# Patient Record
Sex: FEMALE | Race: ASIAN | NOT HISPANIC OR LATINO | Employment: FULL TIME | URBAN - METROPOLITAN AREA
[De-identification: names, ages, dates, MRNs, and addresses within clinical notes are randomized per-mention and may not be internally consistent; named-entity substitution may affect disease eponyms.]

---

## 2020-10-01 ENCOUNTER — OFFICE VISIT (OUTPATIENT)
Dept: MEDICAL GROUP | Facility: PHYSICIAN GROUP | Age: 35
End: 2020-10-01
Payer: COMMERCIAL

## 2020-10-01 VITALS
WEIGHT: 128.75 LBS | HEIGHT: 64 IN | SYSTOLIC BLOOD PRESSURE: 100 MMHG | HEART RATE: 71 BPM | TEMPERATURE: 98.8 F | BODY MASS INDEX: 21.98 KG/M2 | DIASTOLIC BLOOD PRESSURE: 60 MMHG | OXYGEN SATURATION: 98 %

## 2020-10-01 DIAGNOSIS — R73.01 IMPAIRED FASTING BLOOD SUGAR: ICD-10-CM

## 2020-10-01 DIAGNOSIS — D50.0 ANEMIA DUE TO BLOOD LOSS, CHRONIC: ICD-10-CM

## 2020-10-01 DIAGNOSIS — Z00.00 ROUTINE PHYSICAL EXAMINATION: ICD-10-CM

## 2020-10-01 DIAGNOSIS — Z23 NEED FOR IMMUNIZATION AGAINST INFLUENZA: ICD-10-CM

## 2020-10-01 PROCEDURE — 90686 IIV4 VACC NO PRSV 0.5 ML IM: CPT | Performed by: FAMILY MEDICINE

## 2020-10-01 PROCEDURE — 99385 PREV VISIT NEW AGE 18-39: CPT | Mod: 25 | Performed by: FAMILY MEDICINE

## 2020-10-01 PROCEDURE — 90471 IMMUNIZATION ADMIN: CPT | Performed by: FAMILY MEDICINE

## 2020-10-01 SDOH — HEALTH STABILITY: MENTAL HEALTH: HOW OFTEN DO YOU HAVE A DRINK CONTAINING ALCOHOL?: NEVER

## 2020-10-01 ASSESSMENT — PATIENT HEALTH QUESTIONNAIRE - PHQ9: CLINICAL INTERPRETATION OF PHQ2 SCORE: 0

## 2020-10-01 NOTE — LETTER
[facility]  Mairya Hardy M.D.  [address]  [place] [address]  Fax: [phone]   Authorization for Release/Disclosure of   Protected Health Information   Name: RAYMUNDO CALLAHAN : 1985 SSN: [id number]   Address: [facility] [address]  [place] [address] Phone:    [phone] (home)    I authorize the entity listed below to release/disclose the PHI below to:   [facility]/Mariya Hardy M.D. and Mariya Hardy M.D.   Provider or Entity Name:    [facility]   Address   City, [facility], [facility]   Phone:      Fax:     Reason for request: continuity of care   Information to be released:    [  ] LAST COLONOSCOPY,  including any PATH REPORT and follow-up  [  ] LAST FIT/COLOGUARD RESULT [  ] LAST DEXA  [  ] LAST MAMMOGRAM  [  ] LAST PAP  [  ] LAST LABS [  ] RETINA EXAM REPORT  [  ] IMMUNIZATION RECORDS  [ x ] Release all info      [  ] Check here and initial the line next to each item to release ALL health information INCLUDING  _____ Care and treatment for drug and / or alcohol abuse  _____ HIV testing, infection status, or AIDS  _____ Genetic Testing    DATES OF SERVICE OR TIME PERIOD TO BE DISCLOSED: __2019 hospital records___________  I understand and acknowledge that:  * This Authorization may be revoked at any time by you in writing, except if your health information has already been used or disclosed.  * Your health information that will be used or disclosed as a result of you signing this authorization could be re-disclosed by the recipient. If this occurs, your re-disclosed health information may no longer be protected by State or Federal laws.  * You may refuse to sign this Authorization. Your refusal will not affect your ability to obtain treatment.  * This Authorization becomes effective upon signing and will  on (date) __________.      If no date is indicated, this Authorization will  one (1) year from the signature date.    Name: Raymundo Callahan    Signature:   Date:      10/1/2020       PLEASE FAX REQUESTED RECORDS BACK TO: [phone]

## 2020-10-01 NOTE — LETTER
[facility]  Mariya Hardy M.D.  [address]  [place] [address]  Fax: [phone]   Authorization for Release/Disclosure of   Protected Health Information   Name: RAYMUNDO CALLAHAN : 1985 SSN: [id number]   Address: [facility] [address]  [place] [address] Phone:    [phone] (home)    I authorize the entity listed below to release/disclose the PHI below to:   [facility]/Mariya Hardy M.D. and Mariya Hardy M.D.   Provider or Entity Name:    [facility]   Address   [facility], [place], [place] Phone:      Fax:     Reason for request: continuity of care   Information to be released:    [  ] LAST COLONOSCOPY,  including any PATH REPORT and follow-up  [  ] LAST FIT/COLOGUARD RESULT [  ] LAST DEXA  [  ] LAST MAMMOGRAM  [  ] LAST PAP  [  ] LAST LABS [  ] RETINA EXAM REPORT  [  ] IMMUNIZATION RECORDS  [x  ] Release all info      [  ] Check here and initial the line next to each item to release ALL health information INCLUDING  _____ Care and treatment for drug and / or alcohol abuse  _____ HIV testing, infection status, or AIDS  _____ Genetic Testing    DATES OF SERVICE OR TIME PERIOD TO BE DISCLOSED: _____________  I understand and acknowledge that:  * This Authorization may be revoked at any time by you in writing, except if your health information has already been used or disclosed.  * Your health information that will be used or disclosed as a result of you signing this authorization could be re-disclosed by the recipient. If this occurs, your re-disclosed health information may no longer be protected by State or Federal laws.  * You may refuse to sign this Authorization. Your refusal will not affect your ability to obtain treatment.  * This Authorization becomes effective upon signing and will  on (date) __________.      If no date is indicated, this Authorization will  one (1) year from the signature date.    Name: Raymundo Callahan    Signature:   Date:          10/1/2020       PLEASE FAX REQUESTED RECORDS BACK TO: [phone]

## 2020-10-02 NOTE — PROGRESS NOTES
Subjective:      Raymundo Callahan is a 35 y.o. female who presents with New Patient            HPI     This is a 35-year-old Chinese female who is here as a new patient to get established with me and have a physical exam.  She and her family moved to the area from [place], [place] in August of this year.    She states that in January 2019 her blood work showed she was anemic and so she was told by her PCP to taking prenatal vitamins which she has been taking regularly.  She said her menstrual flow is usually heavy the first 3 days of the period.  Duration is usually 5 days.  She has not been checked if the anemia has resolved.  She said she has been feeling tired/sluggish.    She was also told that when she had her blood work in January 2019 her fasting blood sugar was borderline elevated.  She wants this rechecked.  She has family history of diabetes specifically her father.    She is requesting flu shot today.  She thinks she is up-to-date with her Tdap.  This may have been given to her during her pregnancy 4 years ago.    She said her last Pap smear may have been about 2 years ago and was normal.  We will need to get records from her previous PCP.    I reviewed the following    No past medical history on file.     Past Surgical History:   Procedure Laterality Date   • HYSTEROSCOPY WITH VIDEO DIAGNOSTIC  2019       Not on File    No current outpatient medications on file.     No current facility-administered medications for this visit.         Family History   Problem Relation Age of Onset   • Heart Disease Mother         heart valve, arrythmia   • Diabetes Father    • Hypertension Father        Social History     Socioeconomic History   • Marital status:      Spouse name: Not on file   • Number of children: Not on file   • Years of education: Not on file   • Highest education level: Not on file   Occupational History   • Occupation:    Social Needs   • Financial resource strain: Not on  file   • Food insecurity     Worry: Not on file     Inability: Not on file   • Transportation needs     Medical: Not on file     Non-medical: Not on file   Tobacco Use   • Smoking status: Never Smoker   • Smokeless tobacco: Never Used   Substance and Sexual Activity   • Alcohol use: Never     Frequency: Never   • Drug use: Never   • Sexual activity: Yes     Birth control/protection: Condom   Lifestyle   • Physical activity     Days per week: Not on file     Minutes per session: Not on file   • Stress: Not on file   Relationships   • Social connections     Talks on phone: Not on file     Gets together: Not on file     Attends Taoist service: Not on file     Active member of club or organization: Not on file     Attends meetings of clubs or organizations: Not on file     Relationship status: Not on file   • Intimate partner violence     Fear of current or ex partner: Not on file     Emotionally abused: Not on file     Physically abused: Not on file     Forced sexual activity: Not on file   Other Topics Concern   • Not on file   Social History Narrative   • Not on file        ROS     Review of Systems  Constitutional: Negative for fever, chills, weight loss.  Positive fatigue.  HEENT: Negative for ear pain, nosebleeds, congestion, sore throat and neck pain.    Eyes: Negative for blurred vision.   Respiratory: Negative for cough, sputum production, shortness of breath and wheezing.    Cardiovascular: Negative for chest pain, palpitations, orthopnea and leg swelling.   Gastrointestinal: Negative for heartburn, nausea, vomiting and abdominal pain.   Genitourinary: Negative for dysuria, urgency and frequency.   Musculoskeletal: Negative for myalgias, back pain and joint pain.   Skin: Negative for rash and itching.   Neurological: Negative for dizziness, tingling, tremors, sensory change, focal weakness and headaches.   Endo/Heme/Allergies: Does not bruise/bleed easily.   Psychiatric/Behavioral: Negative for depression,  "anxiety, or memory loss.     All other systems reviewed and are negative except as in HPI.         Objective:     /60 (BP Location: Left arm, Patient Position: Sitting, BP Cuff Size: Adult)   Pulse 71   Temp 37.1 °C (98.8 °F) (Temporal)   Ht 1.626 m (5' 4\")   Wt 58.4 kg (128 lb 12 oz)   SpO2 98%   BMI 22.10 kg/m²      Physical Exam  Vitals signs and nursing note reviewed.   Constitutional:       General: She is not in acute distress.     Appearance: She is well-developed. She is not diaphoretic.   HENT:      Head: Normocephalic and atraumatic.      Right Ear: External ear normal.      Left Ear: External ear normal.      Nose: Nose normal.      Mouth/Throat:      Pharynx: No oropharyngeal exudate.   Eyes:      General: No scleral icterus.        Right eye: No discharge.         Left eye: No discharge.      Conjunctiva/sclera: Conjunctivae normal.      Pupils: Pupils are equal, round, and reactive to light.      Comments: Pinkish palpebral conjunctivae   Neck:      Musculoskeletal: Normal range of motion and neck supple.      Thyroid: No thyromegaly.      Vascular: No JVD.      Trachea: No tracheal deviation.   Cardiovascular:      Rate and Rhythm: Normal rate and regular rhythm.      Heart sounds: Normal heart sounds. No murmur. No friction rub. No gallop.    Pulmonary:      Effort: Pulmonary effort is normal. No respiratory distress.      Breath sounds: Normal breath sounds. No stridor. No wheezing or rales.   Chest:      Chest wall: No tenderness.   Abdominal:      General: Bowel sounds are normal. There is no distension.      Palpations: Abdomen is soft. There is no mass.      Tenderness: There is no abdominal tenderness. There is no guarding or rebound.      Hernia: No hernia is present.   Musculoskeletal: Normal range of motion.         General: No tenderness or deformity.   Lymphadenopathy:      Cervical: No cervical adenopathy.   Skin:     General: Skin is warm and dry.      Coloration: Skin is not " pale.      Findings: No erythema or rash.      Comments: Pinkish nailbeds   Neurological:      Mental Status: She is alert and oriented to person, place, and time.      Cranial Nerves: No cranial nerve deficit.      Motor: No abnormal muscle tone.      Coordination: Coordination normal.      Deep Tendon Reflexes: Reflexes are normal and symmetric. Reflexes normal.   Psychiatric:         Behavior: Behavior normal.         Thought Content: Thought content normal.         Judgment: Judgment normal.                      Assessment/Plan:        1. Routine physical examination  Complete exam done except for GYN exam/Pap smear which was already done by her previous PCP.  We will get medical records from previous PCP.  We will get screening labs.  We will also get TSH because of complaint of fatigue.  - TSH; Future  - Lipid Profile; Future  - Comp Metabolic Panel; Future  - HEMOGLOBIN A1C; Future  - CBC WITH DIFFERENTIAL; Future    2. Anemia due to blood loss, chronic  Most likely anemia is from heavy menstrual bleeding.  I will get updated CBC.  Continue prenatal vitamins until we get the results.  - TSH; Future  - Lipid Profile; Future  - Comp Metabolic Panel; Future  - HEMOGLOBIN A1C; Future  - CBC WITH DIFFERENTIAL; Future    3. Impaired fasting blood sugar  We will do updated blood work to check fasting blood sugar and hemoglobin A1c.  - TSH; Future  - Lipid Profile; Future  - Comp Metabolic Panel; Future  - HEMOGLOBIN A1C; Future  - CBC WITH DIFFERENTIAL; Future    4. Need for immunization against influenza  Flu shot was given.  - Influenza Vaccine Quad Injection (PF)    Return yearly for physical exam or sooner if needed.  We may have to do a GYN exam/Pap smear next year if this is already due depending on her medical records.

## 2020-10-23 ENCOUNTER — HOSPITAL ENCOUNTER (OUTPATIENT)
Dept: LAB | Facility: MEDICAL CENTER | Age: 35
End: 2020-10-23
Attending: FAMILY MEDICINE
Payer: COMMERCIAL

## 2020-10-23 DIAGNOSIS — Z00.00 ROUTINE PHYSICAL EXAMINATION: ICD-10-CM

## 2020-10-23 DIAGNOSIS — D50.0 ANEMIA DUE TO BLOOD LOSS, CHRONIC: ICD-10-CM

## 2020-10-23 DIAGNOSIS — R73.01 IMPAIRED FASTING BLOOD SUGAR: ICD-10-CM

## 2020-10-23 LAB
ALBUMIN SERPL BCP-MCNC: 4.8 G/DL (ref 3.2–4.9)
ALBUMIN/GLOB SERPL: 1.7 G/DL
ALP SERPL-CCNC: 40 U/L (ref 30–99)
ALT SERPL-CCNC: 13 U/L (ref 2–50)
ANION GAP SERPL CALC-SCNC: 9 MMOL/L (ref 7–16)
AST SERPL-CCNC: 14 U/L (ref 12–45)
BASOPHILS # BLD AUTO: 1 % (ref 0–1.8)
BASOPHILS # BLD: 0.05 K/UL (ref 0–0.12)
BILIRUB SERPL-MCNC: 0.4 MG/DL (ref 0.1–1.5)
BUN SERPL-MCNC: 8 MG/DL (ref 8–22)
CALCIUM SERPL-MCNC: 9.6 MG/DL (ref 8.5–10.5)
CHLORIDE SERPL-SCNC: 102 MMOL/L (ref 96–112)
CHOLEST SERPL-MCNC: 177 MG/DL (ref 100–199)
CO2 SERPL-SCNC: 27 MMOL/L (ref 20–33)
CREAT SERPL-MCNC: 0.58 MG/DL (ref 0.5–1.4)
EOSINOPHIL # BLD AUTO: 0.05 K/UL (ref 0–0.51)
EOSINOPHIL NFR BLD: 1 % (ref 0–6.9)
ERYTHROCYTE [DISTWIDTH] IN BLOOD BY AUTOMATED COUNT: 43.8 FL (ref 35.9–50)
EST. AVERAGE GLUCOSE BLD GHB EST-MCNC: 111 MG/DL
FASTING STATUS PATIENT QL REPORTED: NORMAL
GLOBULIN SER CALC-MCNC: 2.8 G/DL (ref 1.9–3.5)
GLUCOSE SERPL-MCNC: 97 MG/DL (ref 65–99)
HBA1C MFR BLD: 5.5 % (ref 0–5.6)
HCT VFR BLD AUTO: 41.8 % (ref 37–47)
HDLC SERPL-MCNC: 67 MG/DL
HGB BLD-MCNC: 13.7 G/DL (ref 12–16)
IMM GRANULOCYTES # BLD AUTO: 0.01 K/UL (ref 0–0.11)
IMM GRANULOCYTES NFR BLD AUTO: 0.2 % (ref 0–0.9)
LDLC SERPL CALC-MCNC: 93 MG/DL
LYMPHOCYTES # BLD AUTO: 1.83 K/UL (ref 1–4.8)
LYMPHOCYTES NFR BLD: 35.4 % (ref 22–41)
MCH RBC QN AUTO: 32 PG (ref 27–33)
MCHC RBC AUTO-ENTMCNC: 32.8 G/DL (ref 33.6–35)
MCV RBC AUTO: 97.7 FL (ref 81.4–97.8)
MONOCYTES # BLD AUTO: 0.28 K/UL (ref 0–0.85)
MONOCYTES NFR BLD AUTO: 5.4 % (ref 0–13.4)
NEUTROPHILS # BLD AUTO: 2.95 K/UL (ref 2–7.15)
NEUTROPHILS NFR BLD: 57 % (ref 44–72)
NRBC # BLD AUTO: 0 K/UL
NRBC BLD-RTO: 0 /100 WBC
PLATELET # BLD AUTO: 224 K/UL (ref 164–446)
PMV BLD AUTO: 10.4 FL (ref 9–12.9)
POTASSIUM SERPL-SCNC: 4.1 MMOL/L (ref 3.6–5.5)
PROT SERPL-MCNC: 7.6 G/DL (ref 6–8.2)
RBC # BLD AUTO: 4.28 M/UL (ref 4.2–5.4)
SODIUM SERPL-SCNC: 138 MMOL/L (ref 135–145)
TRIGL SERPL-MCNC: 87 MG/DL (ref 0–149)
TSH SERPL DL<=0.005 MIU/L-ACNC: 2.55 UIU/ML (ref 0.38–5.33)
WBC # BLD AUTO: 5.2 K/UL (ref 4.8–10.8)

## 2020-10-23 PROCEDURE — 36415 COLL VENOUS BLD VENIPUNCTURE: CPT

## 2020-10-23 PROCEDURE — 80053 COMPREHEN METABOLIC PANEL: CPT

## 2020-10-23 PROCEDURE — 80061 LIPID PANEL: CPT

## 2020-10-23 PROCEDURE — 85025 COMPLETE CBC W/AUTO DIFF WBC: CPT

## 2020-10-23 PROCEDURE — 83036 HEMOGLOBIN GLYCOSYLATED A1C: CPT

## 2020-10-23 PROCEDURE — 84443 ASSAY THYROID STIM HORMONE: CPT

## 2021-08-19 ENCOUNTER — OFFICE VISIT (OUTPATIENT)
Dept: MEDICAL GROUP | Facility: PHYSICIAN GROUP | Age: 36
End: 2021-08-19
Payer: COMMERCIAL

## 2021-08-19 VITALS
HEIGHT: 64 IN | HEART RATE: 66 BPM | TEMPERATURE: 98.3 F | DIASTOLIC BLOOD PRESSURE: 60 MMHG | SYSTOLIC BLOOD PRESSURE: 80 MMHG | BODY MASS INDEX: 22.43 KG/M2 | OXYGEN SATURATION: 98 % | WEIGHT: 131.39 LBS

## 2021-08-19 DIAGNOSIS — Z13.220 SCREENING, LIPID: ICD-10-CM

## 2021-08-19 DIAGNOSIS — Z83.3 FAMILY HISTORY OF DIABETES MELLITUS: ICD-10-CM

## 2021-08-19 DIAGNOSIS — Z00.00 ROUTINE PHYSICAL EXAMINATION: ICD-10-CM

## 2021-08-19 PROCEDURE — 99395 PREV VISIT EST AGE 18-39: CPT | Performed by: FAMILY MEDICINE

## 2021-08-19 ASSESSMENT — FIBROSIS 4 INDEX: FIB4 SCORE: .6240377207533827623

## 2021-08-19 ASSESSMENT — PATIENT HEALTH QUESTIONNAIRE - PHQ9: CLINICAL INTERPRETATION OF PHQ2 SCORE: 0

## 2021-08-20 NOTE — PROGRESS NOTES
Kelli Callahan is a 35 y.o. female who presents with Annual Exam            HPI     Patient is here for routine physical exam.  Her last Pap smear was done by previous PCP in December 2018 that came back no evidence of malignancy and negative for HPV.  She said she is probably up-to-date with Tdap because she delivered her daughter 5 years ago and may have received at that time.  She already got a COVID-19 vaccination x2 doses.  Denies any acute problems or concerns.    She and her  are planning to conceive second child.    She also needs screening labs.  There is family history of diabetes so she would like to include a hemoglobin A1c.  The last blood work in October 2020 came back all within normal limits including normal fasting blood sugar, normal hemoglobin A1c and normal lipid panel.    I reviewed the following    History reviewed. No pertinent past medical history.     Past Surgical History:   Procedure Laterality Date   • HYSTEROSCOPY WITH VIDEO DIAGNOSTIC  2019       Not on File    No current outpatient medications on file.     No current facility-administered medications for this visit.        Family History   Problem Relation Age of Onset   • Heart Disease Mother         heart valve, arrythmia   • Diabetes Father    • Hypertension Father        Social History     Socioeconomic History   • Marital status:      Spouse name: Not on file   • Number of children: Not on file   • Years of education: Not on file   • Highest education level: Not on file   Occupational History   • Occupation:    Tobacco Use   • Smoking status: Never Smoker   • Smokeless tobacco: Never Used   Vaping Use   • Vaping Use: Never used   Substance and Sexual Activity   • Alcohol use: Never   • Drug use: Never   • Sexual activity: Yes     Birth control/protection: Condom   Other Topics Concern   • Not on file   Social History Narrative   • Not on file     Social Determinants of Health  "    Financial Resource Strain:    • Difficulty of Paying Living Expenses:    Food Insecurity:    • Worried About Running Out of Food in the Last Year:    • Ran Out of Food in the Last Year:    Transportation Needs:    • Lack of Transportation (Medical):    • Lack of Transportation (Non-Medical):    Physical Activity:    • Days of Exercise per Week:    • Minutes of Exercise per Session:    Stress:    • Feeling of Stress :    Social Connections:    • Frequency of Communication with Friends and Family:    • Frequency of Social Gatherings with Friends and Family:    • Attends Mandaen Services:    • Active Member of Clubs or Organizations:    • Attends Club or Organization Meetings:    • Marital Status:    Intimate Partner Violence:    • Fear of Current or Ex-Partner:    • Emotionally Abused:    • Physically Abused:    • Sexually Abused:         ROS     As per HPI, the rest are negative.           Objective     BP (!) 80/60 (BP Location: Left arm, Patient Position: Sitting, BP Cuff Size: Adult)   Pulse 66   Temp 36.8 °C (98.3 °F) (Temporal)   Ht 1.626 m (5' 4\")   Wt 59.6 kg (131 lb 6.3 oz)   SpO2 98%   BMI 22.55 kg/m²      Physical Exam  Vitals and nursing note reviewed.   Constitutional:       General: She is not in acute distress.     Appearance: She is well-developed. She is not diaphoretic.   HENT:      Head: Normocephalic and atraumatic.      Right Ear: External ear normal.      Left Ear: External ear normal.      Nose: Nose normal.      Mouth/Throat:      Pharynx: No oropharyngeal exudate.   Eyes:      General: No scleral icterus.        Right eye: No discharge.         Left eye: No discharge.      Conjunctiva/sclera: Conjunctivae normal.      Pupils: Pupils are equal, round, and reactive to light.   Neck:      Thyroid: No thyromegaly.      Vascular: No JVD.      Trachea: No tracheal deviation.   Cardiovascular:      Rate and Rhythm: Normal rate and regular rhythm.      Heart sounds: Normal heart sounds. No " murmur heard.   No friction rub. No gallop.    Pulmonary:      Effort: Pulmonary effort is normal. No respiratory distress.      Breath sounds: Normal breath sounds. No stridor. No wheezing or rales.   Chest:      Chest wall: No tenderness.   Abdominal:      General: Bowel sounds are normal. There is no distension.      Palpations: Abdomen is soft. There is no mass.      Tenderness: There is no abdominal tenderness. There is no guarding or rebound.      Hernia: No hernia is present.   Musculoskeletal:         General: No tenderness or deformity. Normal range of motion.      Cervical back: Normal range of motion and neck supple.   Lymphadenopathy:      Cervical: No cervical adenopathy.   Skin:     General: Skin is warm and dry.      Coloration: Skin is not pale.      Findings: No erythema or rash.   Neurological:      Mental Status: She is alert and oriented to person, place, and time.      Cranial Nerves: No cranial nerve deficit.      Motor: No abnormal muscle tone.      Coordination: Coordination normal.      Deep Tendon Reflexes: Reflexes are normal and symmetric. Reflexes normal.   Psychiatric:         Behavior: Behavior normal.         Thought Content: Thought content normal.         Judgment: Judgment normal.                     Assessment & Plan        1. Routine physical examination  Complete exam done except for GYN exam/Pap smear which will be due this coming December.  We discussed that if she conceives or becomes pregnant before December then she will need to be referred to the OB/GYN and they can do the GYN exam/Pap smear instead of me doing it in December.  Patient voiced understanding.  I will send her for screening labs.  - Lipid Profile; Future  - Comp Metabolic Panel; Future  - CBC WITH DIFFERENTIAL; Future  - HEMOGLOBIN A1C; Future    2. Family history of diabetes mellitus  We will do updated fasting blood sugar with hemoglobin A1c.  - Lipid Profile; Future  - Comp Metabolic Panel; Future  - CBC  WITH DIFFERENTIAL; Future  - HEMOGLOBIN A1C; Future    3. Screening, lipid  Screening lipid panel ordered.  - Lipid Profile; Future  - Comp Metabolic Panel; Future  - CBC WITH DIFFERENTIAL; Future  - HEMOGLOBIN A1C; Future    Return for GYN exam/Pap smear in December 2021.  If she becomes pregnant before then she will let me know so we can refer to OB/GYN.  She will provide me a copy of her Tdap.      Please note that this dictation was created using voice recognition software. I have worked with consultants from the vendor as well as technical experts from [facility]  [employer] to optimize the interface. I have made every reasonable attempt to correct obvious errors, but I expect that there are errors of grammar and possibly content I did not discover before finalizing the note.

## 2021-09-10 ENCOUNTER — HOSPITAL ENCOUNTER (OUTPATIENT)
Dept: LAB | Facility: MEDICAL CENTER | Age: 36
End: 2021-09-10
Attending: FAMILY MEDICINE
Payer: COMMERCIAL

## 2021-09-10 DIAGNOSIS — Z13.220 SCREENING, LIPID: ICD-10-CM

## 2021-09-10 DIAGNOSIS — R73.09 ELEVATED HEMOGLOBIN A1C: ICD-10-CM

## 2021-09-10 DIAGNOSIS — Z83.3 FAMILY HISTORY OF DIABETES MELLITUS: ICD-10-CM

## 2021-09-10 DIAGNOSIS — Z00.00 ROUTINE PHYSICAL EXAMINATION: ICD-10-CM

## 2021-09-10 LAB
ALBUMIN SERPL BCP-MCNC: 4.8 G/DL (ref 3.2–4.9)
ALBUMIN/GLOB SERPL: 1.8 G/DL
ALP SERPL-CCNC: 47 U/L (ref 30–99)
ALT SERPL-CCNC: 10 U/L (ref 2–50)
ANION GAP SERPL CALC-SCNC: 12 MMOL/L (ref 7–16)
AST SERPL-CCNC: 12 U/L (ref 12–45)
BASOPHILS # BLD AUTO: 0.6 % (ref 0–1.8)
BASOPHILS # BLD: 0.04 K/UL (ref 0–0.12)
BILIRUB SERPL-MCNC: 0.6 MG/DL (ref 0.1–1.5)
BUN SERPL-MCNC: 9 MG/DL (ref 8–22)
CALCIUM SERPL-MCNC: 9.5 MG/DL (ref 8.5–10.5)
CHLORIDE SERPL-SCNC: 105 MMOL/L (ref 96–112)
CHOLEST SERPL-MCNC: 169 MG/DL (ref 100–199)
CO2 SERPL-SCNC: 23 MMOL/L (ref 20–33)
CREAT SERPL-MCNC: 0.67 MG/DL (ref 0.5–1.4)
EOSINOPHIL # BLD AUTO: 0.12 K/UL (ref 0–0.51)
EOSINOPHIL NFR BLD: 1.9 % (ref 0–6.9)
ERYTHROCYTE [DISTWIDTH] IN BLOOD BY AUTOMATED COUNT: 46.5 FL (ref 35.9–50)
EST. AVERAGE GLUCOSE BLD GHB EST-MCNC: 163 MG/DL
FASTING STATUS PATIENT QL REPORTED: NORMAL
GLOBULIN SER CALC-MCNC: 2.6 G/DL (ref 1.9–3.5)
GLUCOSE SERPL-MCNC: 95 MG/DL (ref 65–99)
HBA1C MFR BLD: 7.3 % (ref 4–5.6)
HCT VFR BLD AUTO: 42 % (ref 37–47)
HDLC SERPL-MCNC: 62 MG/DL
HGB BLD-MCNC: 13.8 G/DL (ref 12–16)
IMM GRANULOCYTES # BLD AUTO: 0.02 K/UL (ref 0–0.11)
IMM GRANULOCYTES NFR BLD AUTO: 0.3 % (ref 0–0.9)
LDLC SERPL CALC-MCNC: 90 MG/DL
LYMPHOCYTES # BLD AUTO: 2 K/UL (ref 1–4.8)
LYMPHOCYTES NFR BLD: 31.4 % (ref 22–41)
MCH RBC QN AUTO: 30.7 PG (ref 27–33)
MCHC RBC AUTO-ENTMCNC: 32.9 G/DL (ref 33.6–35)
MCV RBC AUTO: 93.5 FL (ref 81.4–97.8)
MONOCYTES # BLD AUTO: 0.37 K/UL (ref 0–0.85)
MONOCYTES NFR BLD AUTO: 5.8 % (ref 0–13.4)
NEUTROPHILS # BLD AUTO: 3.82 K/UL (ref 2–7.15)
NEUTROPHILS NFR BLD: 60 % (ref 44–72)
NRBC # BLD AUTO: 0 K/UL
NRBC BLD-RTO: 0 /100 WBC
PLATELET # BLD AUTO: 244 K/UL (ref 164–446)
PMV BLD AUTO: 10.3 FL (ref 9–12.9)
POTASSIUM SERPL-SCNC: 4.4 MMOL/L (ref 3.6–5.5)
PROT SERPL-MCNC: 7.4 G/DL (ref 6–8.2)
RBC # BLD AUTO: 4.49 M/UL (ref 4.2–5.4)
SODIUM SERPL-SCNC: 140 MMOL/L (ref 135–145)
TRIGL SERPL-MCNC: 83 MG/DL (ref 0–149)
WBC # BLD AUTO: 6.4 K/UL (ref 4.8–10.8)

## 2021-09-10 PROCEDURE — 80053 COMPREHEN METABOLIC PANEL: CPT

## 2021-09-10 PROCEDURE — 80061 LIPID PANEL: CPT

## 2021-09-10 PROCEDURE — 85025 COMPLETE CBC W/AUTO DIFF WBC: CPT

## 2021-09-10 PROCEDURE — 36415 COLL VENOUS BLD VENIPUNCTURE: CPT

## 2021-09-10 PROCEDURE — 83036 HEMOGLOBIN GLYCOSYLATED A1C: CPT

## 2021-12-14 ENCOUNTER — HOSPITAL ENCOUNTER (OUTPATIENT)
Dept: LAB | Facility: MEDICAL CENTER | Age: 36
End: 2021-12-14
Attending: FAMILY MEDICINE
Payer: COMMERCIAL

## 2021-12-14 DIAGNOSIS — R73.09 ELEVATED HEMOGLOBIN A1C: ICD-10-CM

## 2021-12-14 LAB
EST. AVERAGE GLUCOSE BLD GHB EST-MCNC: 97 MG/DL
HBA1C MFR BLD: 5 % (ref 4–5.6)

## 2021-12-14 PROCEDURE — 83036 HEMOGLOBIN GLYCOSYLATED A1C: CPT

## 2021-12-14 PROCEDURE — 36415 COLL VENOUS BLD VENIPUNCTURE: CPT

## 2021-12-20 ENCOUNTER — APPOINTMENT (OUTPATIENT)
Dept: MEDICAL GROUP | Facility: PHYSICIAN GROUP | Age: 36
End: 2021-12-20
Payer: COMMERCIAL

## 2022-01-12 ENCOUNTER — OFFICE VISIT (OUTPATIENT)
Dept: MEDICAL GROUP | Facility: PHYSICIAN GROUP | Age: 37
End: 2022-01-12
Payer: COMMERCIAL

## 2022-01-12 ENCOUNTER — HOSPITAL ENCOUNTER (OUTPATIENT)
Facility: MEDICAL CENTER | Age: 37
End: 2022-01-12
Attending: FAMILY MEDICINE
Payer: COMMERCIAL

## 2022-01-12 VITALS
DIASTOLIC BLOOD PRESSURE: 60 MMHG | BODY MASS INDEX: 22.47 KG/M2 | SYSTOLIC BLOOD PRESSURE: 90 MMHG | OXYGEN SATURATION: 98 % | HEIGHT: 64 IN | WEIGHT: 131.61 LBS | TEMPERATURE: 97.7 F | HEART RATE: 64 BPM

## 2022-01-12 DIAGNOSIS — Z11.51 SCREENING FOR HUMAN PAPILLOMAVIRUS (HPV): ICD-10-CM

## 2022-01-12 DIAGNOSIS — Z01.419 ENCOUNTER FOR ROUTINE GYNECOLOGICAL EXAMINATION WITH PAPANICOLAOU SMEAR OF CERVIX: ICD-10-CM

## 2022-01-12 PROCEDURE — 87624 HPV HI-RISK TYP POOLED RSLT: CPT

## 2022-01-12 PROCEDURE — 99000 SPECIMEN HANDLING OFFICE-LAB: CPT | Performed by: FAMILY MEDICINE

## 2022-01-12 PROCEDURE — 88175 CYTOPATH C/V AUTO FLUID REDO: CPT

## 2022-01-12 PROCEDURE — 99395 PREV VISIT EST AGE 18-39: CPT | Performed by: FAMILY MEDICINE

## 2022-01-12 ASSESSMENT — PATIENT HEALTH QUESTIONNAIRE - PHQ9: CLINICAL INTERPRETATION OF PHQ2 SCORE: 0

## 2022-01-12 ASSESSMENT — FIBROSIS 4 INDEX: FIB4 SCORE: 0.58

## 2022-01-13 DIAGNOSIS — Z11.51 SCREENING FOR HUMAN PAPILLOMAVIRUS (HPV): ICD-10-CM

## 2022-01-13 DIAGNOSIS — Z01.419 ENCOUNTER FOR ROUTINE GYNECOLOGICAL EXAMINATION WITH PAPANICOLAOU SMEAR OF CERVIX: ICD-10-CM

## 2022-01-13 NOTE — PROGRESS NOTES
Kelli Callahan is a 36 y.o. female who presents with Gynecologic Exam (PAP and GYN)            HPI     Patient is here for routine GYN exam/Pap smear.  The last Pap smear was in 2018 that came back normal.  No history of STDs.  No history of abnormal Pap smears.  Patient is  1 para 1.  LMP 2022.  She and her  use condoms for contraception.  Up-to-date with Tdap, flu shot, and she is fully vaccinated with COVID-19 vaccine including booster shot.    I reviewed the following    History reviewed. No pertinent past medical history.     Past Surgical History:   Procedure Laterality Date   • HYSTEROSCOPY WITH VIDEO DIAGNOSTIC         Not on File    No current outpatient medications on file.     No current facility-administered medications for this visit.        Family History   Problem Relation Age of Onset   • Heart Disease Mother         heart valve, arrythmia   • Diabetes Father    • Hypertension Father        Social History     Socioeconomic History   • Marital status:      Spouse name: Not on file   • Number of children: 1   • Years of education: Not on file   • Highest education level: Not on file   Occupational History   • Occupation:    Tobacco Use   • Smoking status: Never Smoker   • Smokeless tobacco: Never Used   Vaping Use   • Vaping Use: Never used   Substance and Sexual Activity   • Alcohol use: Never   • Drug use: Never   • Sexual activity: Yes     Birth control/protection: Condom   Other Topics Concern   • Not on file   Social History Narrative   • Not on file     Social Determinants of Health     Financial Resource Strain:    • Difficulty of Paying Living Expenses: Not on file   Food Insecurity:    • Worried About Running Out of Food in the Last Year: Not on file   • Ran Out of Food in the Last Year: Not on file   Transportation Needs:    • Lack of Transportation (Medical): Not on file   • Lack of Transportation (Non-Medical): Not on file  "  Physical Activity:    • Days of Exercise per Week: Not on file   • Minutes of Exercise per Session: Not on file   Stress:    • Feeling of Stress : Not on file   Social Connections:    • Frequency of Communication with Friends and Family: Not on file   • Frequency of Social Gatherings with Friends and Family: Not on file   • Attends Gnosticist Services: Not on file   • Active Member of Clubs or Organizations: Not on file   • Attends Club or Organization Meetings: Not on file   • Marital Status: Not on file   Intimate Partner Violence:    • Fear of Current or Ex-Partner: Not on file   • Emotionally Abused: Not on file   • Physically Abused: Not on file   • Sexually Abused: Not on file   Housing Stability:    • Unable to Pay for Housing in the Last Year: Not on file   • Number of Places Lived in the Last Year: Not on file   • Unstable Housing in the Last Year: Not on file        ROS     As per HPI, the rest are negative.           Objective     BP (!) 90/60 (BP Location: Left arm, Patient Position: Sitting, BP Cuff Size: Adult)   Pulse 64   Temp 36.5 °C (97.7 °F) (Temporal)   Ht 1.626 m (5' 4\")   Wt 59.7 kg (131 lb 9.8 oz)   SpO2 98%   BMI 22.59 kg/m²      Physical Exam  Vitals and nursing note reviewed.   Constitutional:       General: She is not in acute distress.     Appearance: She is well-developed. She is not diaphoretic.   HENT:      Head: Normocephalic and atraumatic.      Right Ear: External ear normal.      Left Ear: External ear normal.      Nose: Nose normal.      Mouth/Throat:      Pharynx: No oropharyngeal exudate.   Eyes:      General: No scleral icterus.        Right eye: No discharge.         Left eye: No discharge.      Conjunctiva/sclera: Conjunctivae normal.      Pupils: Pupils are equal, round, and reactive to light.   Neck:      Thyroid: No thyromegaly.      Trachea: No tracheal deviation.   Cardiovascular:      Rate and Rhythm: Normal rate and regular rhythm.      Heart sounds: Normal " heart sounds. No murmur heard.  No gallop.    Pulmonary:      Effort: Pulmonary effort is normal. No respiratory distress.      Breath sounds: Normal breath sounds. No stridor. No wheezing or rales.   Chest:   Breasts: Breasts are symmetrical.      Right: No inverted nipple, mass, nipple discharge, skin change or tenderness.      Left: No inverted nipple, mass, nipple discharge, skin change or tenderness.       Abdominal:      General: Bowel sounds are normal. There is no distension.      Palpations: Abdomen is soft. There is no mass.      Tenderness: There is no abdominal tenderness. There is no guarding or rebound.      Hernia: There is no hernia in the left inguinal area or right inguinal area.   Genitourinary:     General: Normal vulva.      Labia:         Right: No rash.         Left: No rash.       Vagina: Normal. No vaginal discharge.      Cervix: Erythema present. No cervical motion tenderness, discharge or friability.      Uterus: Not deviated, not enlarged, not fixed and not tender.       Adnexa:         Right: No mass, tenderness or fullness.          Left: No mass, tenderness or fullness.     Musculoskeletal:         General: No tenderness. Normal range of motion.      Cervical back: Normal range of motion and neck supple.   Lymphadenopathy:      Cervical: No cervical adenopathy.      Lower Body: No right inguinal adenopathy. No left inguinal adenopathy.   Skin:     General: Skin is warm.      Coloration: Skin is not pale.      Findings: No erythema or rash.   Neurological:      Mental Status: She is alert and oriented to person, place, and time.      Cranial Nerves: No cranial nerve deficit.      Motor: No abnormal muscle tone.      Coordination: Coordination normal.      Deep Tendon Reflexes: Reflexes normal.   Psychiatric:         Behavior: Behavior normal.         Thought Content: Thought content normal.                                  Assessment & Plan        1. Encounter for routine gynecological  examination with Papanicolaou smear of cervix  Complete exam was done.  Pap smear was done.  Specimen was packaged and sent to the lab.  Up-to-date with immunizations.  - THINPREP PAP WITH HPV; Future    2. Screening for human papillomavirus (HPV)  Screening HPV was done.  - THINPREP PAP WITH HPV; Future    Return yearly for physical exam and every 3 years for GYN exam/Pap smear.        Please note that this dictation was created using voice recognition software. I have worked with consultants from the vendor as well as technical experts from [facility] to optimize the interface. I have made every reasonable attempt to correct obvious errors, but I expect that there are errors of grammar and possibly content I did not discover before finalizing the note.

## 2022-01-16 LAB
CYTOLOGY REG CYTOL: NORMAL
HPV HR 12 DNA CVX QL NAA+PROBE: NEGATIVE
HPV16 DNA SPEC QL NAA+PROBE: NEGATIVE
HPV18 DNA SPEC QL NAA+PROBE: NEGATIVE
SPECIMEN SOURCE: NORMAL

## 2022-11-10 ENCOUNTER — OFFICE VISIT (OUTPATIENT)
Dept: MEDICAL GROUP | Facility: PHYSICIAN GROUP | Age: 37
End: 2022-11-10
Payer: COMMERCIAL

## 2022-11-10 VITALS
WEIGHT: 131.8 LBS | HEIGHT: 64 IN | SYSTOLIC BLOOD PRESSURE: 100 MMHG | HEART RATE: 73 BPM | RESPIRATION RATE: 16 BRPM | DIASTOLIC BLOOD PRESSURE: 60 MMHG | BODY MASS INDEX: 22.5 KG/M2 | TEMPERATURE: 97.6 F | OXYGEN SATURATION: 96 %

## 2022-11-10 DIAGNOSIS — Z31.69 PRE-CONCEPTION COUNSELING: ICD-10-CM

## 2022-11-10 DIAGNOSIS — Z11.59 ENCOUNTER FOR HEPATITIS C SCREENING TEST FOR LOW RISK PATIENT: ICD-10-CM

## 2022-11-10 DIAGNOSIS — Z00.00 HEALTH CARE MAINTENANCE: ICD-10-CM

## 2022-11-10 DIAGNOSIS — R73.01 IMPAIRED FASTING BLOOD SUGAR: ICD-10-CM

## 2022-11-10 DIAGNOSIS — Z01.84 IMMUNITY STATUS TESTING: ICD-10-CM

## 2022-11-10 PROCEDURE — 99213 OFFICE O/P EST LOW 20 MIN: CPT | Performed by: STUDENT IN AN ORGANIZED HEALTH CARE EDUCATION/TRAINING PROGRAM

## 2022-11-10 RX ORDER — COVID-19 ANTIGEN TEST
KIT MISCELLANEOUS
COMMUNITY
Start: 2022-09-18

## 2022-11-10 ASSESSMENT — FIBROSIS 4 INDEX: FIB4 SCORE: 0.59

## 2022-11-10 NOTE — PROGRESS NOTES
"Subjective:     CC:  Diagnoses of Encounter for hepatitis C screening test for low risk patient, Immunity status testing, Pre-conception counseling, Health care maintenance, and Impaired fasting blood sugar were pertinent to this visit.    HISTORY OF THE PRESENT ILLNESS: Patient is a 37 y.o. female.  She was formally a patient of Dr. Hardeep Hardy MD.  She does not wish to establish today.  This pleasant patient is here today to discuss:    1. Encounter for hepatitis C screening test for low risk patient    2. Immunity status testing  Patient is unsure of hepatitis B immunization status.    3. Pre-conception counseling  Patient has 1 child at home.  She is interested in having 1 more child.  She is concerned about her age.    4. Health care maintenance    5. Impaired fasting blood sugar  1 elevated A1c measurement last year.  This spontaneously resolved.  She does have a family history of diabetes.    Active Diagnosis:    Patient Active Problem List   Diagnosis    Anemia due to blood loss, chronic    Impaired fasting blood sugar    Pre-conception counseling      Current Outpatient Medications Ordered in Epic   Medication Sig Dispense Refill    FLOWFLEX COVID-19 AG HOME TEST Kit FOLLOW INSTRUCTIONS INCLUDED WITH THE PACKAGE. (Patient not taking: Reported on 11/10/2022)       No current [facility]-ordered facility-administered medications on file.     ROS:   Gen: No fevers/chills, no changes in weight  HEENT: No changes in vision/hearing, sore throat.  Pulm: No cough, unexplained SOB.  CV: No chest pain/pressure, no palpitations  GI: No nausea/vomiting, no diarrhea  : No dysuria/nocturia  MSk: No myalgias  Skin: No rash/skin changes  Neuro: No headaches, no numbness/tingling  Heme/Lymph: no easy bruising      Objective:     Exam: /60 (BP Location: Left arm, Patient Position: Sitting, BP Cuff Size: Adult)   Pulse 73   Temp 36.4 °C (97.6 °F) (Temporal)   Resp 16   Ht 1.626 m (5' 4\")   Wt 59.8 kg (131 lb 12.8 oz)   " SpO2 96%  Body mass index is 22.62 kg/m².    General: Normal appearing. No distress.  HEENT: Normocephalic. Eyes conjunctiva clear lids without ptosis. Pupils equal and reactive to light accommodation. Ears normal shape and contour. Canals are clear bilaterally, tympanic membranes are benign. Nasal mucosa benign, oropharynx is without erythema, edema or exudates.   Neck: Supple without JVD. Thyroid is not enlarged.  Pulmonary: Clear to ausculation.  Normal effort. No rales, ronchi, or wheezing.  Cardiovascular: Regular rate and rhythm without murmur. Radial pulses are intact and equal bilaterally.  Abdomen: Nondistended   neurologic: Grossly nonfocal.  CN II through XII intact.  Lymph: No cervical or supraclavicular lymph nodes are palpable  Skin: Warm and dry.  No obvious lesions.  Musculoskeletal: Normal gait. No extremity cyanosis, clubbing, or edema.  Psych: Normal mood and affect. Alert and oriented x3. Judgment and insight are normal.    A chaperone was offered to the patient during today's exam. Patient declined chaperone.    Labs:   9/10/2021:  -CBC, WNL   -CMP, WNL  -Hemoglobin A1c 7.3%  -Lipid profile showing total cholesterol 169, triglycerides 83, HDL 62, LDL 90.    2021:  -Hemoglobin A1c 5.0%    Assessment & Plan:   37 y.o. female with the following -    1. Encounter for hepatitis C screening test for low risk patient  - HCV Scrn ( [id number] 1xLife); Future    2. Immunity status testing  - HEP B SURFACE AB; Future    3. Pre-conception counseling  - Referral to OB/Gyn    4. Health care maintenance  - CBC WITHOUT DIFFERENTIAL; Future  - Comp Metabolic Panel; Future  - VITAMIN D,25 HYDROXY (DEFICIENCY); Future  - Lipid Profile; Future    5. Impaired fasting blood sugar  - Unclear if this was a transient finding or represents a chronic condition.  - HEMOGLOBIN A1C; Future      No follow-ups on file.    Please note that this dictation was created using voice recognition software. I have made every  reasonable attempt to correct obvious errors, but I expect that there are errors of grammar and possibly content that I did not discover before finalizing the note.      Kong Andres PA-C 11/10/2022

## 2022-12-08 ENCOUNTER — HOSPITAL ENCOUNTER (OUTPATIENT)
Dept: LAB | Facility: MEDICAL CENTER | Age: 37
End: 2022-12-08
Attending: STUDENT IN AN ORGANIZED HEALTH CARE EDUCATION/TRAINING PROGRAM
Payer: COMMERCIAL

## 2022-12-08 DIAGNOSIS — Z01.84 IMMUNITY STATUS TESTING: ICD-10-CM

## 2022-12-08 DIAGNOSIS — Z00.00 HEALTH CARE MAINTENANCE: ICD-10-CM

## 2022-12-08 DIAGNOSIS — Z11.59 ENCOUNTER FOR HEPATITIS C SCREENING TEST FOR LOW RISK PATIENT: ICD-10-CM

## 2022-12-08 DIAGNOSIS — R73.01 IMPAIRED FASTING BLOOD SUGAR: ICD-10-CM

## 2022-12-08 LAB
25(OH)D3 SERPL-MCNC: 27 NG/ML (ref 30–100)
ALBUMIN SERPL BCP-MCNC: 4.6 G/DL (ref 3.2–4.9)
ALBUMIN/GLOB SERPL: 1.7 G/DL
ALP SERPL-CCNC: 34 U/L (ref 30–99)
ALT SERPL-CCNC: 10 U/L (ref 2–50)
ANION GAP SERPL CALC-SCNC: 12 MMOL/L (ref 7–16)
AST SERPL-CCNC: 14 U/L (ref 12–45)
BILIRUB SERPL-MCNC: 0.5 MG/DL (ref 0.1–1.5)
BUN SERPL-MCNC: 8 MG/DL (ref 8–22)
CALCIUM SERPL-MCNC: 9.4 MG/DL (ref 8.4–10.2)
CHLORIDE SERPL-SCNC: 103 MMOL/L (ref 96–112)
CHOLEST SERPL-MCNC: 151 MG/DL (ref 100–199)
CO2 SERPL-SCNC: 22 MMOL/L (ref 20–33)
CREAT SERPL-MCNC: 0.55 MG/DL (ref 0.5–1.4)
ERYTHROCYTE [DISTWIDTH] IN BLOOD BY AUTOMATED COUNT: 41.1 FL (ref 35.9–50)
EST. AVERAGE GLUCOSE BLD GHB EST-MCNC: 105 MG/DL
FASTING STATUS PATIENT QL REPORTED: NORMAL
GFR SERPLBLD CREATININE-BSD FMLA CKD-EPI: 120 ML/MIN/1.73 M 2
GLOBULIN SER CALC-MCNC: 2.7 G/DL (ref 1.9–3.5)
GLUCOSE SERPL-MCNC: 89 MG/DL (ref 65–99)
HBA1C MFR BLD: 5.3 % (ref 4–5.6)
HBV SURFACE AB SERPL IA-ACNC: 3165 MIU/ML (ref 0–10)
HCT VFR BLD AUTO: 38 % (ref 37–47)
HCV AB SER QL: NORMAL
HDLC SERPL-MCNC: 67 MG/DL
HGB BLD-MCNC: 13 G/DL (ref 12–16)
LDLC SERPL CALC-MCNC: 63 MG/DL
MCH RBC QN AUTO: 32 PG (ref 27–33)
MCHC RBC AUTO-ENTMCNC: 34.2 G/DL (ref 33.6–35)
MCV RBC AUTO: 93.6 FL (ref 81.4–97.8)
PLATELET # BLD AUTO: 218 K/UL (ref 164–446)
PMV BLD AUTO: 9.5 FL (ref 9–12.9)
POTASSIUM SERPL-SCNC: 4 MMOL/L (ref 3.6–5.5)
PROT SERPL-MCNC: 7.3 G/DL (ref 6–8.2)
RBC # BLD AUTO: 4.06 M/UL (ref 4.2–5.4)
SODIUM SERPL-SCNC: 137 MMOL/L (ref 135–145)
TRIGL SERPL-MCNC: 106 MG/DL (ref 0–149)
WBC # BLD AUTO: 6.5 K/UL (ref 4.8–10.8)

## 2022-12-08 PROCEDURE — 80061 LIPID PANEL: CPT

## 2022-12-08 PROCEDURE — 85027 COMPLETE CBC AUTOMATED: CPT

## 2022-12-08 PROCEDURE — 80053 COMPREHEN METABOLIC PANEL: CPT

## 2022-12-08 PROCEDURE — 82306 VITAMIN D 25 HYDROXY: CPT

## 2022-12-08 PROCEDURE — 36415 COLL VENOUS BLD VENIPUNCTURE: CPT

## 2022-12-08 PROCEDURE — 83036 HEMOGLOBIN GLYCOSYLATED A1C: CPT

## 2022-12-08 PROCEDURE — 86706 HEP B SURFACE ANTIBODY: CPT

## 2022-12-08 PROCEDURE — G0472 HEP C SCREEN HIGH RISK/OTHER: HCPCS

## 2025-02-04 ENCOUNTER — OFFICE VISIT (OUTPATIENT)
Dept: URGENT CARE | Facility: CLINIC | Age: 40
End: 2025-02-04
Payer: COMMERCIAL

## 2025-02-04 VITALS
TEMPERATURE: 98.3 F | HEART RATE: 74 BPM | SYSTOLIC BLOOD PRESSURE: 90 MMHG | OXYGEN SATURATION: 98 % | DIASTOLIC BLOOD PRESSURE: 48 MMHG | BODY MASS INDEX: 21.77 KG/M2 | RESPIRATION RATE: 12 BRPM | WEIGHT: 126.8 LBS

## 2025-02-04 DIAGNOSIS — L98.9 FINGER LESION: ICD-10-CM

## 2025-02-04 DIAGNOSIS — J02.9 SORE THROAT: ICD-10-CM

## 2025-02-04 LAB — S PYO DNA SPEC NAA+PROBE: NOT DETECTED

## 2025-02-04 PROCEDURE — 99213 OFFICE O/P EST LOW 20 MIN: CPT | Performed by: FAMILY MEDICINE

## 2025-02-04 PROCEDURE — 3074F SYST BP LT 130 MM HG: CPT | Performed by: FAMILY MEDICINE

## 2025-02-04 PROCEDURE — 87651 STREP A DNA AMP PROBE: CPT | Performed by: FAMILY MEDICINE

## 2025-02-04 PROCEDURE — 3078F DIAST BP <80 MM HG: CPT | Performed by: FAMILY MEDICINE

## 2025-02-04 ASSESSMENT — ENCOUNTER SYMPTOMS: SORE THROAT: 1

## 2025-02-04 NOTE — PROGRESS NOTES
"Kelli Callahan is a 39 y.o. female who presents with Hand Pain (\"Left hand index finger skin colored bump, states painful and that theres a black spot\")    This is a  new problem with uncertain prognosis:   This is a very pleasant 39 y.o. who has come to the walk-in clinic today for sore throat for 4 to 5 days felt better yesterday and felt it again today.  Not much in the way of coughing nasal symptoms.  No fevers chills.  Works at a .    Yesterday noticed a tender bump on the left index finger.  No specific injury          ALLERGIES:  Patient has no allergy information on record.     PMH:  History reviewed. No pertinent past medical history.     PSH:  Past Surgical History:   Procedure Laterality Date    HYSTEROSCOPY WITH VIDEO DIAGNOSTIC  2019       MEDS:  No current outpatient medications on file.    ** I have documented what I find to be significant in regards to past medical, social, family and surgical history  in my HPI or under PMH/PSH/FH review section, otherwise it is noncontributory **           HPI    Review of Systems   HENT:  Positive for sore throat.    Skin:  Positive for rash.   All other systems reviewed and are negative.             Objective     BP 90/48 (BP Location: Left arm, Patient Position: Sitting, BP Cuff Size: Adult)   Pulse 74   Temp 36.8 °C (98.3 °F) (Temporal)   Resp 12   Wt 57.5 kg (126 lb 12.8 oz)   SpO2 98%   BMI 21.77 kg/m²      Physical Exam  Vitals and nursing note reviewed.   Constitutional:       General: She is not in acute distress.     Appearance: Normal appearance. She is well-developed. She is not ill-appearing, toxic-appearing or diaphoretic.   HENT:      Head: Normocephalic.      Mouth/Throat:      Pharynx: Posterior oropharyngeal erythema present. No oropharyngeal exudate.   Cardiovascular:      Rate and Rhythm: Normal rate and regular rhythm.      Heart sounds: Normal heart sounds.   Pulmonary:      Effort: Pulmonary effort is normal. No " respiratory distress.   Musculoskeletal:        Hands:       Cervical back: No tenderness.      Comments: Left index flexor aspect with a approximate 2 x 3 mm area of some mild callus hyper keratosis formation.  Center with a small black speck of something.  Good strength range of motion no streaking or erythema   Lymphadenopathy:      Cervical: No cervical adenopathy.   Neurological:      Mental Status: She is alert.      Motor: No abnormal muscle tone.   Psychiatric:         Mood and Affect: Mood normal.         Behavior: Behavior normal.                             Assessment & Plan     1. Finger lesion  Referral to Dermatology      2. Sore throat  POCT GROUP A STREP, PCR      Suspected Viral pharyngitis will rule out strep    Left index lesion prepped with alcohol and an 18-gauge needle was used to pick away the small black speck no obvious foreign body appreciated.  Tolerated well      - Dx, plan & d/c instructions discussed   - Rest, stay hydrated, OTC Motrin and/or Tylenol as needed      Follow up with your regular primary care providers office within a week to keep them updated and informed of this visit and for regular routine health maintenance check-ups. ER if not improving in 2-3 days or if feeling/getting worse. (If you do not have a primary care provider and need to schedule one you may call [facility] at [phone] to do this).    Patient left in stable condition     Discussed if any testing, labs or imaging studies are obtained outside of the [facility] facility, it is their responsibility to contact the Urgent Care and let us know that it was done and get us the results so adequate follow up can be initiated    Pertinent prior lab work and/or imaging studies in Epic have been reviewed by me today on day of this visit and taken into account for my treatment and plan today    Pertinent PMH/PSH and/or chronic conditions and medications if any were reviewed today and taken into account for my treatment and  plan today    Pertinent prior office visit notes in Epic have been reviewed by me today on day of this visit.    Please note that this dictation may have been created using voice recognition software, if so I have made every reasonable attempt to correct obvious errors, but I expect that there are errors of grammar and possibly content that I did not discover before finalizing the note.